# Patient Record
Sex: MALE | Race: WHITE | NOT HISPANIC OR LATINO | Employment: OTHER | URBAN - METROPOLITAN AREA
[De-identification: names, ages, dates, MRNs, and addresses within clinical notes are randomized per-mention and may not be internally consistent; named-entity substitution may affect disease eponyms.]

---

## 2018-01-15 ENCOUNTER — ALLSCRIPTS OFFICE VISIT (OUTPATIENT)
Dept: OTHER | Facility: OTHER | Age: 33
End: 2018-01-15

## 2018-01-15 LAB
BILIRUB UR QL STRIP: NEGATIVE
CLARITY UR: NORMAL
COLOR UR: YELLOW
GLUCOSE (HISTORICAL): NEGATIVE
HGB UR QL STRIP.AUTO: NEGATIVE
KETONES UR STRIP-MCNC: NEGATIVE MG/DL
LEUKOCYTE ESTERASE UR QL STRIP: NEGATIVE
NITRITE UR QL STRIP: NEGATIVE
PH UR STRIP.AUTO: 6 [PH]
PROT UR STRIP-MCNC: NEGATIVE MG/DL
SP GR UR STRIP.AUTO: 1.02
UROBILINOGEN UR QL STRIP.AUTO: 0.2

## 2018-01-16 NOTE — PROGRESS NOTES
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 he does not report require corrective lenses  Chief Complaint   patient presents today for the express purpose is of a CDL examination he has not takes no medications he has no history of seizure disorder cardiac disorder epilepsy or asthma he is not diabetic and he does not have sleep apnea he has good color vision and has no      History of Present Illness   HPI: patient presents for CDL physical      Review of Systems        Constitutional: no fever or chills, feels well, no tiredness, no recent weight loss or weight gain  ENT: no complaints of earache, no loss of hearing, no nosebleeds or nasal discharge, no sore throat or hoarseness  Cardiovascular: no complaints of slow or fast heart rate, no chest pain, no palpitations, no leg claudication or lower extremity edema  Respiratory: no complaints of shortness of breath, no wheezing or cough, no dyspnea on exertion, no orthopnea or PND  Gastrointestinal: no complaints of abdominal pain, no constipation, no nausea or vomiting, no diarrhea or bloody stools  Genitourinary: no complaints of dysuria or incontinence, no hesitancy, no nocturia, no genital lesion, no inadequacy of penile erection  Musculoskeletal: no complaints of arthralgia, no myalgia, no joint swelling or stiffness, no limb pain or swelling  Integumentary: no complaints of skin rash or lesion, no itching or dry skin, no skin wounds        Neurological: no complaints of headache, no confusion, no numbness or tingling, no dizziness or fainting  ROS reviewed  Active Problems   1  Encounter for CDL (commercial driving license) exam (O45 7) (Z02 4)   2  Need for influenza vaccination (V04 81) (Z23)    Past Medical History   Active Problems And Past Medical History Reviewed: The active problems and past medical history were reviewed and updated today  Family History   Mother    1  No pertinent family history  Father    2  No pertinent family history  Family History Reviewed: The family history was reviewed and updated today  Social History    · Never a smoker   · Never Drank Alcohol  The social history was reviewed and updated today  The social history was reviewed and is unchanged  Surgical History   1  History of Knee Surgery  Surgical History Reviewed: The surgical history was reviewed and updated today  Current Meds    1  No Reported Medications Recorded     The medication list was reviewed and updated today  Allergies   1  No Known Drug Allergies    Vitals    Recorded: [id number] 05:22PM Recorded: [id number] 11:05EC   Systolic 390 767, LUE, Sitting   Diastolic 82 292, LUE, Sitting   Height  5 ft 11 in   Weight  196 lb    BMI Calculated  27 34   BSA Calculated  2 09     Physical Exam        Constitutional      General appearance: No acute distress, well appearing and well nourished  Eyes      Conjunctiva and lids: No swelling, erythema, or discharge  Pupils and irises: Equal, round and reactive to light  Ears, Nose, Mouth, and Throat      External inspection of ears and nose: Normal        Otoscopic examination: Tympanic membrance translucent with normal light reflex  Canals patent without erythema  Nasal mucosa, septum, and turbinates: Normal without edema or erythema  Oropharynx: Normal with no erythema, edema, exudate or lesions  Pulmonary      Respiratory effort: No increased work of breathing or signs of respiratory distress         Auscultation of lungs: Clear to auscultation, equal breath sounds bilaterally, no wheezes, no rales, no rhonci  Cardiovascular      Palpation of heart: Normal PMI, no thrills  Auscultation of heart: Normal rate and rhythm, normal S1 and S2, without murmurs  Examination of extremities for edema and/or varicosities: Normal        Carotid pulses: Normal        Abdomen      Abdomen: Non-tender, no masses  Liver and spleen: No hepatomegaly or splenomegaly  Lymphatic      Palpation of lymph nodes in neck: No lymphadenopathy  Musculoskeletal      Gait and station: Normal        Digits and nails: Normal without clubbing or cyanosis  Inspection/palpation of joints, bones, and muscles: Normal        Skin      Skin and subcutaneous tissue: Normal without rashes or lesions  Neurologic      Cranial nerves: Cranial nerves 2-12 intact  Reflexes: 2+ and symmetric  Sensation: No sensory loss         Psychiatric      Orientation to person, place and time: Normal        Mood and affect: Normal           Results/Data   SNELLEN VISION- POC [id number] 09:47AM Shreyas Mazariegos      Test Name Result Flag Reference   Right Eye uncorrected 20/15     Left Eye uncorrected 20/15     Bilateral Eyes uncorrected 20/13        Urine Dip Automated- POC [id number] 09:47AM Shreyas Mazariegos      Test Name Result Flag Reference   Color Yellow     Clarity Transparent     Leukocytes negative     Nitrite negative     Blood negative     Bilirubin negative     Urobilinogen 0 2     Protein negative     Ph 6 0     Specific Gravity 1 025     Ketone negative     Glucose negative        *VB-Depression Screening [id number] 09:45AM Shreyas Mazariegos      Test Name Result Flag Reference   Depression Scale Result      Depression Screen - Negative For Symptoms      PHQ-2 Adult Depression Screening [id number] 09:44AM User, Ahs      Test Name Result Flag Reference   PHQ-2 Adult Depression Score 0     Over the last two weeks, how often have you been bothered by any of the following problems?      Little interest or pleasure in doing things: Not at all - 0     Feeling down, depressed, or hopeless: Not at all - 0   PHQ-2 Adult Depression Screening Negative          Signatures    Electronically signed by : Killian Man DO; Delonte 15 2018  5:23PM EST                       (Author)

## 2018-01-23 VITALS
WEIGHT: 196 LBS | DIASTOLIC BLOOD PRESSURE: 82 MMHG | HEIGHT: 71 IN | BODY MASS INDEX: 27.44 KG/M2 | SYSTOLIC BLOOD PRESSURE: 132 MMHG

## 2018-03-19 ENCOUNTER — OFFICE VISIT (OUTPATIENT)
Dept: URGENT CARE | Facility: CLINIC | Age: 33
End: 2018-03-19
Payer: COMMERCIAL

## 2018-03-19 VITALS
DIASTOLIC BLOOD PRESSURE: 62 MMHG | HEART RATE: 113 BPM | RESPIRATION RATE: 20 BRPM | OXYGEN SATURATION: 96 % | TEMPERATURE: 101.3 F | SYSTOLIC BLOOD PRESSURE: 170 MMHG

## 2018-03-19 DIAGNOSIS — J11.1 INFLUENZA: Primary | ICD-10-CM

## 2018-03-19 PROCEDURE — S9088 SERVICES PROVIDED IN URGENT: HCPCS | Performed by: FAMILY MEDICINE

## 2018-03-19 PROCEDURE — 99203 OFFICE O/P NEW LOW 30 MIN: CPT | Performed by: FAMILY MEDICINE

## 2018-03-19 RX ORDER — PREDNISONE 50 MG/1
50 TABLET ORAL DAILY
Qty: 5 TABLET | Refills: 0 | Status: SHIPPED | OUTPATIENT
Start: 2018-03-19 | End: 2018-03-24

## 2018-03-19 NOTE — PROGRESS NOTES
[employer] [facility] [employer]        NAME: Narendra Person is a 28 y o  male  : 1985    MRN: [id number]  DATE: 2018  TIME: 5:02 PM    Assessment and Plan   Influenza [J11 1]  1  Influenza           Patient Instructions       Follow up with PCP in 3-5 days  Proceed to  ER if symptoms worsen  Chief Complaint     Chief Complaint   Patient presents with   Colletta Claw Like Symptoms     Started Friday with coughing body aches and fevers  Not taking any OTC cold medications  [facility] LPN          History of Present Illness       Started Friday with coughing body aches and fevers  Not taking any OTC cold medications  URI    This is a new problem  The current episode started in the past 7 days  The problem has been unchanged  The maximum temperature recorded prior to his arrival was 102 - 102 9 F  The fever has been present for 3 to 4 days  Associated symptoms include joint pain  Review of Systems   Review of Systems   Constitutional: Positive for activity change, chills, fatigue and fever  HENT: Negative  Eyes: Negative  Respiratory: Negative  Cardiovascular: Negative  Gastrointestinal: Negative  Musculoskeletal: Positive for joint pain  Current Medications     No current outpatient prescriptions on file  Current Allergies     Allergies as of 2018    (No Known Allergies)            The following portions of the patient's history were reviewed and updated as appropriate: allergies, current medications, past family history, past medical history, past social history, past surgical history and problem list      No past medical history on file  No past surgical history on file  No family history on file  Medications have been verified  Objective   /62   Pulse (!) 113   Temp (!) 101 3 °F (38 5 °C) (Tympanic)   Resp 20   SpO2 96%        Physical Exam     Physical Exam   Constitutional: He is oriented to person, place, and time   He appears well-developed  HENT:   Right Ear: External ear normal    Left Ear: External ear normal    Nose: Nose normal    Mouth/Throat: Oropharynx is clear and moist  No oropharyngeal exudate  Eyes: Conjunctivae are normal    Neck: Normal range of motion  Neck supple  Cardiovascular: Normal rate, regular rhythm and normal heart sounds  No murmur heard  Pulmonary/Chest: Effort normal and breath sounds normal  No respiratory distress  He has no wheezes  He has no rales  He exhibits no tenderness  Abdominal: Soft  He exhibits no distension and no mass  There is no tenderness  There is no rebound and no guarding  Musculoskeletal: Normal range of motion  Lymphadenopathy:     He has no cervical adenopathy  Neurological: He is alert and oriented to person, place, and time  No cranial nerve deficit  Skin: Skin is warm  No rash noted  No erythema

## 2018-03-19 NOTE — PATIENT INSTRUCTIONS
Influenza   AMBULATORY CARE:   Influenza  (the flu) is an infection caused by the influenza virus  The flu is easily spread when an infected person coughs, sneezes, or has close contact with others  You may be able to spread the flu to others for 1 week or longer after signs or symptoms appear  Common signs and symptoms include the following:   · Fever and chills    · Headaches, body aches, and muscle or joint pain    · Cough, runny nose, and sore throat    · Loss of appetite, nausea, vomiting, or diarrhea    · Tiredness    · Trouble breathing  Call 911 for any of the following:   · You have trouble breathing, and your lips look purple or blue  · You have a seizure  Seek care immediately if:   · You are dizzy, or you are urinating less or not at all  · You have a headache with a stiff neck, and you feel tired or confused  · You have new pain or pressure in your chest     · Your symptoms, such as shortness of breath, vomiting, or diarrhea, get worse  · Your symptoms, such as fever and coughing, seem to get better, but then get worse  Contact your healthcare provider if:   · You have new muscle pain or weakness  · You have questions or concerns about your condition or care  Treatment for influenza  may include any of the following:  · Acetaminophen  decreases pain and fever  It is available without a doctor's order  Ask how much to take and how often to take it  Follow directions  Acetaminophen can cause liver damage if not taken correctly  · NSAIDs , such as ibuprofen, help decrease swelling, pain, and fever  This medicine is available with or without a doctor's order  NSAIDs can cause stomach bleeding or kidney problems in certain people  If you take blood thinner medicine, always ask your healthcare provider if NSAIDs are safe for you  Always read the medicine label and follow directions  · Antivirals  help fight a viral infection    Manage your symptoms:   · Rest  as much as you can to help you recover  · Drink liquids as directed  to help prevent dehydration  Ask how much liquid to drink each day and which liquids are best for you  Prevent the spread of the flu:   · Wash your hands often  Use soap and water  Wash your hands after you use the bathroom, change a child's diapers, or sneeze  Wash your hands before you prepare or eat food  Use gel hand cleanser when soap and water are not available  Do not touch your eyes, nose, or mouth unless you have washed your hands first            · Cover your mouth when you sneeze or cough  Cough into a tissue or the bend of your arm  · Clean shared items with a germ-killing   Clean table surfaces, doorknobs, and light switches  Do not share towels, silverware, and dishes with people who are sick  Wash bed sheets, towels, silverware, and dishes with soap and water  · Wear a mask  over your mouth and nose if you are sick or are near anyone who is sick  · Stay away from others  if you are sick  · Influenza vaccine  helps prevent influenza (flu)  Everyone older than 6 months should get a yearly influenza vaccine  Get the vaccine as soon as it is available, usually in September or October each year  Follow up with your healthcare provider as directed:  Write down your questions so you remember to ask them during your visits  © 2017 [employer] Information is for End User's use only and may not be sold, redistributed or otherwise used for commercial purposes  All illustrations and images included in CareNotes® are the copyrighted property of [employer]  or [employer]  The above information is an  only  It is not intended as medical advice for individual conditions or treatments  Talk to your doctor, nurse or pharmacist before following any medical regimen to see if it is safe and effective for you

## 2021-01-07 ENCOUNTER — OFFICE VISIT (OUTPATIENT)
Dept: UROLOGY | Facility: MEDICAL CENTER | Age: 36
End: 2021-01-07
Payer: COMMERCIAL

## 2021-01-07 VITALS
DIASTOLIC BLOOD PRESSURE: 82 MMHG | WEIGHT: 200 LBS | SYSTOLIC BLOOD PRESSURE: 134 MMHG | BODY MASS INDEX: 28 KG/M2 | HEIGHT: 71 IN

## 2021-01-07 DIAGNOSIS — Z30.09 STERILIZATION CONSULT: Primary | ICD-10-CM

## 2021-01-07 PROCEDURE — 99243 OFF/OP CNSLTJ NEW/EST LOW 30: CPT | Performed by: UROLOGY

## 2021-01-07 RX ORDER — HYDROCODONE BITARTRATE AND ACETAMINOPHEN 5; 325 MG/1; MG/1
TABLET ORAL
Qty: 10 TABLET | Refills: 0 | Status: SHIPPED | OUTPATIENT
Start: 2021-01-07

## 2021-01-07 RX ORDER — ALPRAZOLAM 1 MG/1
TABLET ORAL
Qty: 1 TABLET | Refills: 0 | Status: SHIPPED | OUTPATIENT
Start: 2021-01-07

## 2021-01-07 NOTE — PROGRESS NOTES
HISTORY:    Vasectomy consultation  No  history         ASSESSMENT / PLAN:        This patient has for children and would like to have a vasectomy  He and his wife or partner are sure they want no more children, and are aware that vasectomy is intended to be a permanent form of sterilization  He has been told and understands the following: We will order a semen analysis to check for sterility after three months, and that he must use protection during that time  No guarantee can be made of permanent sterility, and that failure of the procedure is not common, but can occur  Furthermore, spontaneous reestablishment of the flow sperm is quite rare but is a possible reason for failure of the procedure  Although it is not mandatory, if he wants to request another semen sample at any time in the future, to ensure continued sterility, he can do so, at his decision  Potential complications of the procedure include, but are not limited to:  Excessive bleeding which can cause significant swelling; infections; chronic lingering pain of the testicle; damage to the blood supply of the testicle, which might lead to testicular atrophy  The patient has told me he understands the above statements, and wishes to proceed with scheduling the vasectomy  The following portions of the patient's history were reviewed and updated as appropriate: allergies, current medications, past family history, past medical history, past social history, past surgical history and problem list     Review of Systems   All other systems reviewed and are negative  Objective:     Physical Exam  Genitourinary:     Comments: Penis testes normal          No results found for: PSA]  No results found for: BUN  No results found for: CREATININE  No components found for: CBC      There is no problem list on file for this patient         Diagnoses and all orders for this visit:    Sterilization consult Patient ID: Milton Hare is a 28 y o  male  No current outpatient medications on file  No past medical history on file  No past surgical history on file      Social History

## 2021-03-12 ENCOUNTER — PROCEDURE VISIT (OUTPATIENT)
Dept: UROLOGY | Facility: MEDICAL CENTER | Age: 36
End: 2021-03-12
Payer: COMMERCIAL

## 2021-03-12 VITALS — HEIGHT: 71 IN | DIASTOLIC BLOOD PRESSURE: 82 MMHG | BODY MASS INDEX: 27.89 KG/M2 | SYSTOLIC BLOOD PRESSURE: 136 MMHG

## 2021-03-12 DIAGNOSIS — Z30.2 ENCOUNTER FOR VASECTOMY: Primary | ICD-10-CM

## 2021-03-12 PROCEDURE — 55250 REMOVAL OF SPERM DUCT(S): CPT | Performed by: UROLOGY

## 2021-03-12 PROCEDURE — 88302 TISSUE EXAM BY PATHOLOGIST: CPT | Performed by: PATHOLOGY

## 2021-03-16 ENCOUNTER — TELEPHONE (OUTPATIENT)
Dept: UROLOGY | Facility: MEDICAL CENTER | Age: 36
End: 2021-03-16

## 2021-03-16 NOTE — TELEPHONE ENCOUNTER
Spoke with pt regarding his procedure on 3/12/21  Pt said, he is doing well  Pt has no questions    Pt has a follow up for vas check,

## 2024-10-18 ENCOUNTER — APPOINTMENT (OUTPATIENT)
Dept: URGENT CARE | Facility: CLINIC | Age: 39
End: 2024-10-18

## 2025-02-21 ENCOUNTER — APPOINTMENT (EMERGENCY)
Dept: CT IMAGING | Facility: HOSPITAL | Age: 40
End: 2025-02-21
Payer: COMMERCIAL

## 2025-02-21 ENCOUNTER — HOSPITAL ENCOUNTER (EMERGENCY)
Facility: HOSPITAL | Age: 40
Discharge: HOME | End: 2025-02-21
Attending: EMERGENCY MEDICINE | Admitting: EMERGENCY MEDICINE
Payer: COMMERCIAL

## 2025-02-21 ENCOUNTER — APPOINTMENT (EMERGENCY)
Dept: ULTRASOUND IMAGING | Facility: HOSPITAL | Age: 40
End: 2025-02-21
Payer: COMMERCIAL

## 2025-02-21 VITALS
HEART RATE: 66 BPM | WEIGHT: 196.87 LBS | BODY MASS INDEX: 27.46 KG/M2 | SYSTOLIC BLOOD PRESSURE: 154 MMHG | OXYGEN SATURATION: 98 % | DIASTOLIC BLOOD PRESSURE: 92 MMHG | TEMPERATURE: 98.1 F | RESPIRATION RATE: 16 BRPM

## 2025-02-21 DIAGNOSIS — R91.1 LUNG NODULE: Primary | ICD-10-CM

## 2025-02-21 DIAGNOSIS — R10.13 EPIGASTRIC ABDOMINAL PAIN: ICD-10-CM

## 2025-02-21 LAB
ALBUMIN SERPL BCG-MCNC: 4.2 G/DL (ref 3.5–5)
ALP SERPL-CCNC: 39 U/L (ref 34–104)
ALT SERPL W P-5'-P-CCNC: 42 U/L (ref 7–52)
ANION GAP SERPL CALCULATED.3IONS-SCNC: 4 MMOL/L (ref 4–13)
APTT PPP: 26 SECONDS (ref 23–34)
AST SERPL W P-5'-P-CCNC: 19 U/L (ref 13–39)
ATRIAL RATE: 62 BPM
BASOPHILS # BLD AUTO: 0.02 THOUSANDS/ΜL (ref 0–0.1)
BASOPHILS NFR BLD AUTO: 0 % (ref 0–1)
BILIRUB SERPL-MCNC: 1.01 MG/DL (ref 0.2–1)
BUN SERPL-MCNC: 17 MG/DL (ref 5–25)
CALCIUM SERPL-MCNC: 8.7 MG/DL (ref 8.4–10.2)
CARDIAC TROPONIN I PNL SERPL HS: 3 NG/L (ref ?–50)
CHLORIDE SERPL-SCNC: 107 MMOL/L (ref 96–108)
CO2 SERPL-SCNC: 29 MMOL/L (ref 21–32)
CREAT SERPL-MCNC: 1.04 MG/DL (ref 0.6–1.3)
EOSINOPHIL # BLD AUTO: 0.17 THOUSAND/ΜL (ref 0–0.61)
EOSINOPHIL NFR BLD AUTO: 3 % (ref 0–6)
ERYTHROCYTE [DISTWIDTH] IN BLOOD BY AUTOMATED COUNT: 12.4 % (ref 11.6–15.1)
GFR SERPL CREATININE-BSD FRML MDRD: 90 ML/MIN/1.73SQ M
GLUCOSE SERPL-MCNC: 74 MG/DL (ref 65–140)
HCT VFR BLD AUTO: 41.8 % (ref 36.5–49.3)
HGB BLD-MCNC: 15 G/DL (ref 12–17)
IMM GRANULOCYTES # BLD AUTO: 0.01 THOUSAND/UL (ref 0–0.2)
IMM GRANULOCYTES NFR BLD AUTO: 0 % (ref 0–2)
INR PPP: 1.02 (ref 0.85–1.19)
LACTATE SERPL-SCNC: 1 MMOL/L (ref 0.5–2)
LIPASE SERPL-CCNC: 13 U/L (ref 11–82)
LYMPHOCYTES # BLD AUTO: 1.45 THOUSANDS/ΜL (ref 0.6–4.47)
LYMPHOCYTES NFR BLD AUTO: 25 % (ref 14–44)
MAGNESIUM SERPL-MCNC: 2.1 MG/DL (ref 1.9–2.7)
MCH RBC QN AUTO: 29.9 PG (ref 26.8–34.3)
MCHC RBC AUTO-ENTMCNC: 35.9 G/DL (ref 31.4–37.4)
MCV RBC AUTO: 83 FL (ref 82–98)
MONOCYTES # BLD AUTO: 0.75 THOUSAND/ΜL (ref 0.17–1.22)
MONOCYTES NFR BLD AUTO: 13 % (ref 4–12)
NEUTROPHILS # BLD AUTO: 3.37 THOUSANDS/ΜL (ref 1.85–7.62)
NEUTS SEG NFR BLD AUTO: 59 % (ref 43–75)
NRBC BLD AUTO-RTO: 0 /100 WBCS
P AXIS: 56 DEGREES
PLATELET # BLD AUTO: 189 THOUSANDS/UL (ref 149–390)
PMV BLD AUTO: 10.2 FL (ref 8.9–12.7)
POTASSIUM SERPL-SCNC: 3.6 MMOL/L (ref 3.5–5.3)
PR INTERVAL: 152 MS
PROT SERPL-MCNC: 6.6 G/DL (ref 6.4–8.4)
PROTHROMBIN TIME: 13.8 SECONDS (ref 12.3–15)
QRS AXIS: 63 DEGREES
QRSD INTERVAL: 92 MS
QT INTERVAL: 376 MS
QTC INTERVAL: 381 MS
RBC # BLD AUTO: 5.02 MILLION/UL (ref 3.88–5.62)
SODIUM SERPL-SCNC: 140 MMOL/L (ref 135–147)
T WAVE AXIS: 9 DEGREES
VENTRICULAR RATE: 62 BPM
WBC # BLD AUTO: 5.77 THOUSAND/UL (ref 4.31–10.16)

## 2025-02-21 PROCEDURE — 85610 PROTHROMBIN TIME: CPT | Performed by: EMERGENCY MEDICINE

## 2025-02-21 PROCEDURE — 83690 ASSAY OF LIPASE: CPT | Performed by: EMERGENCY MEDICINE

## 2025-02-21 PROCEDURE — 74177 CT ABD & PELVIS W/CONTRAST: CPT

## 2025-02-21 PROCEDURE — 83735 ASSAY OF MAGNESIUM: CPT | Performed by: EMERGENCY MEDICINE

## 2025-02-21 PROCEDURE — 85730 THROMBOPLASTIN TIME PARTIAL: CPT | Performed by: EMERGENCY MEDICINE

## 2025-02-21 PROCEDURE — 36415 COLL VENOUS BLD VENIPUNCTURE: CPT | Performed by: EMERGENCY MEDICINE

## 2025-02-21 PROCEDURE — 99284 EMERGENCY DEPT VISIT MOD MDM: CPT

## 2025-02-21 PROCEDURE — 80053 COMPREHEN METABOLIC PANEL: CPT | Performed by: EMERGENCY MEDICINE

## 2025-02-21 PROCEDURE — 99285 EMERGENCY DEPT VISIT HI MDM: CPT | Performed by: EMERGENCY MEDICINE

## 2025-02-21 PROCEDURE — 71275 CT ANGIOGRAPHY CHEST: CPT

## 2025-02-21 PROCEDURE — 96375 TX/PRO/DX INJ NEW DRUG ADDON: CPT

## 2025-02-21 PROCEDURE — 93005 ELECTROCARDIOGRAM TRACING: CPT

## 2025-02-21 PROCEDURE — 96374 THER/PROPH/DIAG INJ IV PUSH: CPT

## 2025-02-21 PROCEDURE — 84484 ASSAY OF TROPONIN QUANT: CPT | Performed by: EMERGENCY MEDICINE

## 2025-02-21 PROCEDURE — 83605 ASSAY OF LACTIC ACID: CPT | Performed by: EMERGENCY MEDICINE

## 2025-02-21 PROCEDURE — 96361 HYDRATE IV INFUSION ADD-ON: CPT

## 2025-02-21 PROCEDURE — 85025 COMPLETE CBC W/AUTO DIFF WBC: CPT | Performed by: EMERGENCY MEDICINE

## 2025-02-21 PROCEDURE — 76705 ECHO EXAM OF ABDOMEN: CPT

## 2025-02-21 RX ORDER — LIDOCAINE HYDROCHLORIDE 20 MG/ML
15 SOLUTION OROPHARYNGEAL ONCE
Status: COMPLETED | OUTPATIENT
Start: 2025-02-21 | End: 2025-02-21

## 2025-02-21 RX ORDER — NEBIVOLOL 10 MG/1
1 TABLET ORAL DAILY
COMMUNITY
Start: 2024-12-23

## 2025-02-21 RX ORDER — KETOROLAC TROMETHAMINE 30 MG/ML
15 INJECTION, SOLUTION INTRAMUSCULAR; INTRAVENOUS ONCE
Status: COMPLETED | OUTPATIENT
Start: 2025-02-21 | End: 2025-02-21

## 2025-02-21 RX ORDER — PANTOPRAZOLE SODIUM 40 MG/10ML
40 INJECTION, POWDER, LYOPHILIZED, FOR SOLUTION INTRAVENOUS ONCE
Status: COMPLETED | OUTPATIENT
Start: 2025-02-21 | End: 2025-02-21

## 2025-02-21 RX ORDER — MAGNESIUM HYDROXIDE/ALUMINUM HYDROXICE/SIMETHICONE 120; 1200; 1200 MG/30ML; MG/30ML; MG/30ML
30 SUSPENSION ORAL ONCE
Status: COMPLETED | OUTPATIENT
Start: 2025-02-21 | End: 2025-02-21

## 2025-02-21 RX ORDER — LOSARTAN POTASSIUM 100 MG/1
100 TABLET ORAL DAILY
COMMUNITY

## 2025-02-21 RX ADMIN — LIDOCAINE HYDROCHLORIDE 15 ML: 20 SOLUTION ORAL at 10:40

## 2025-02-21 RX ADMIN — KETOROLAC TROMETHAMINE 15 MG: 30 INJECTION, SOLUTION INTRAMUSCULAR; INTRAVENOUS at 10:41

## 2025-02-21 RX ADMIN — SODIUM CHLORIDE 1000 ML: 0.9 INJECTION, SOLUTION INTRAVENOUS at 10:44

## 2025-02-21 RX ADMIN — ALUMINUM HYDROXIDE, MAGNESIUM HYDROXIDE, AND SIMETHICONE 30 ML: 1200; 120; 1200 SUSPENSION ORAL at 10:40

## 2025-02-21 RX ADMIN — PANTOPRAZOLE SODIUM 40 MG: 40 INJECTION, POWDER, FOR SOLUTION INTRAVENOUS at 10:42

## 2025-02-21 RX ADMIN — IOHEXOL 100 ML: 350 INJECTION, SOLUTION INTRAVENOUS at 10:59

## 2025-02-21 NOTE — ED PROVIDER NOTES
Time reflects when diagnosis was documented in both MDM as applicable and the Disposition within this note       Time User Action Codes Description Comment    2/21/2025 12:08 PM Leon Linton Add [R91.1] Lung nodule     2/21/2025  1:33 PM Leon Linton Add [R10.13] Epigastric abdominal pain           ED Disposition       ED Disposition   Discharge    Condition   Stable    Date/Time   Fri Feb 21, 2025  1:33 PM    Comment   Aryan Bingham discharge to home/self care.                   Assessment & Plan       Medical Decision Making  Amount and/or Complexity of Data Reviewed  Labs: ordered. Decision-making details documented in ED Course.  Radiology: ordered. Decision-making details documented in ED Course.  ECG/medicine tests: ordered and independent interpretation performed. Decision-making details documented in ED Course.  Discussion of management or test interpretation with external provider(s): At risk for but not limited to STEMI, NSTEMI, cholelithiasis, cholecystitis, peptic ulcer disease, gastritis, pancreatitis, diverticulitis, colitis, bowel obstruction, appendicitis, UTI, ureteral stone, kidney stone, inflammatory bowel disease.    Risk  OTC drugs.  Prescription drug management.        ED Course as of 02/21/25 1345   Fri Feb 21, 2025   1309 Discussed with general surgery.  Can follow-up as an outpatient.  Can see him on Monday.   1342 Discussed with general surgery on-call, Dr. Osullivan.  Could be acalculous cholecystitis.  Will see in the office on Monday at 9:15 AM.  Discussed with patient and wife.  Agree with plan of care.       Medications   sodium chloride 0.9 % bolus 1,000 mL (1,000 mL Intravenous New Bag 2/21/25 1044)   ketorolac (TORADOL) injection 15 mg (15 mg Intravenous Given 2/21/25 1041)   pantoprazole (PROTONIX) injection 40 mg (40 mg Intravenous Given 2/21/25 1042)   aluminum-magnesium hydroxide-simethicone (MAALOX) oral suspension 30 mL (30 mL Oral Given 2/21/25 1040)   Lidocaine  Viscous HCl (XYLOCAINE) 2 % mucosal solution 15 mL (15 mL Swish & Swallow Given 2/21/25 1040)   iohexol (OMNIPAQUE) 350 MG/ML injection (MULTI-DOSE) 100 mL (100 mL Intravenous Given 2/21/25 1059)       ED Risk Strat Scores                                                History of Present Illness       Chief Complaint   Patient presents with    Epigastric Pain     Patient reports x2 days. When he takes a deep breath pain radiates into the back.       Past Medical History:   Diagnosis Date    Hypertension       History reviewed. No pertinent surgical history.   History reviewed. No pertinent family history.   Social History     Tobacco Use    Smoking status: Never    Smokeless tobacco: Never   Substance Use Topics    Alcohol use: Not Currently    Drug use: Not Currently      E-Cigarette/Vaping      E-Cigarette/Vaping Substances      I have reviewed and agree with the history as documented.     Patient complains of diffuse upper abdominal pain has been ongoing for the past 2 days.  Constant.  Radiates to the back.  No nausea or vomiting or diarrhea.  Last bowel movement was today and normal.  No change with eating.  No history of peptic ulcer disease or gallbladder disease.  Denies any alcohol use.  No tobacco use.  Nothing taken prior to arrival for his pain.  No cough or cold symptoms.  No rash.      Epigastric Pain  Pain location:  Epigastric  Pain quality: aching    Pain radiates to the back: yes    Pain severity:  Mild  Onset quality:  Gradual  Duration:  2 days  Timing:  Constant  Progression:  Unchanged  Chronicity:  New  Context: at rest    Relieved by:  Nothing  Worsened by:  Nothing tried  Ineffective treatments:  None tried  Associated symptoms: abdominal pain    Associated symptoms: no anorexia, no claudication, no cough, no dizziness, no dysphagia, no fever, no headache, no nausea, no near-syncope, no palpitations, no shortness of breath, not vomiting and no weakness        Review of Systems    Constitutional:  Negative for appetite change, chills, fever and unexpected weight change.   HENT:  Negative for ear pain, hearing loss, sore throat, trouble swallowing and voice change.    Eyes:  Negative for pain and discharge.   Respiratory:  Negative for cough, shortness of breath and wheezing.    Cardiovascular:  Negative for chest pain, palpitations, claudication and near-syncope.   Gastrointestinal:  Positive for abdominal pain. Negative for anorexia, blood in stool, constipation, diarrhea, nausea and vomiting.   Genitourinary:  Negative for dysuria, flank pain, frequency and hematuria.   Musculoskeletal:  Negative for joint swelling, neck pain and neck stiffness.   Skin:  Negative for rash and wound.   Neurological:  Negative for dizziness, seizures, syncope, facial asymmetry, weakness and headaches.   Psychiatric/Behavioral:  Negative for hallucinations, self-injury and suicidal ideas.    All other systems reviewed and are negative.          Objective       ED Triage Vitals [02/21/25 1022]   Temperature Pulse Blood Pressure Respirations SpO2 Patient Position - Orthostatic VS   98.1 °F (36.7 °C) 62 145/93 17 97 % Lying      Temp src Heart Rate Source BP Location FiO2 (%) Pain Score    -- Monitor Right arm -- 7      Vitals      Date and Time Temp Pulse SpO2 Resp BP Pain Score FACES Pain Rating User   02/21/25 1130 -- 61 97 % 16 140/71 -- --    02/21/25 1045 -- 63 96 % 15 143/93 -- --    02/21/25 1041 -- -- -- -- -- 5 --    02/21/25 1030 -- 62 97 % 16 144/92 -- --    02/21/25 1022 98.1 °F (36.7 °C) 62 97 % 17 145/93 7 -- AS            Physical Exam  Vitals and nursing note reviewed.   Constitutional:       General: He is not in acute distress.     Appearance: He is well-developed.   HENT:      Head: Normocephalic and atraumatic.      Right Ear: External ear normal.      Left Ear: External ear normal.   Eyes:      General: No scleral icterus.        Right eye: No discharge.         Left eye: No  discharge.      Extraocular Movements: Extraocular movements intact.      Conjunctiva/sclera: Conjunctivae normal.   Cardiovascular:      Rate and Rhythm: Normal rate and regular rhythm.      Heart sounds: Normal heart sounds. No murmur heard.  Pulmonary:      Effort: Pulmonary effort is normal.      Breath sounds: Normal breath sounds. No wheezing or rales.   Abdominal:      General: Bowel sounds are normal. There is no distension.      Palpations: Abdomen is soft.      Tenderness: There is abdominal tenderness. There is no guarding or rebound.      Comments: Tender epigastric and left upper quadrant region.   Musculoskeletal:         General: No deformity. Normal range of motion.      Cervical back: Normal range of motion and neck supple.   Skin:     General: Skin is warm and dry.      Findings: No rash.   Neurological:      General: No focal deficit present.      Mental Status: He is alert and oriented to person, place, and time.      Cranial Nerves: No cranial nerve deficit.   Psychiatric:         Mood and Affect: Mood normal.         Behavior: Behavior normal.         Thought Content: Thought content normal.         Judgment: Judgment normal.         Results Reviewed       Procedure Component Value Units Date/Time    HS Troponin 0hr (reflex protocol) [[id number]]  (Normal) Collected: 02/21/25 1039    Lab Status: Final result Specimen: Blood from Arm, Left Updated: 02/21/25 1115     hs TnI 0hr 3 ng/L     Protime-INR [[id number]]  (Normal) Collected: 02/21/25 1039    Lab Status: Final result Specimen: Blood from Arm, Left Updated: 02/21/25 1114     Protime 13.8 seconds      INR 1.02    Narrative:      INR Therapeutic Range    Indication                                             INR Range      Atrial Fibrillation                                               2.0-3.0  Hypercoagulable State                                    2.0.2.3  Left Ventricular Asist Device                            2.0-3.0  Mechanical Heart  Valve                                  -    Aortic(with afib, MI, embolism, HF, LA enlargement,    and/or coagulopathy)                                     2.0-3.0 (2.5-3.5)     Mitral                                                             2.5-3.5  Prosthetic/Bioprosthetic Heart Valve               2.0-3.0  Venous thromboembolism (VTE: VT, PE        2.0-3.0    APTT [[id number]]  (Normal) Collected: 02/21/25 1039    Lab Status: Final result Specimen: Blood from Arm, Left Updated: 02/21/25 1114     PTT 26 seconds     Lactic acid, plasma (w/reflex if result > 2.0) [[id number]]  (Normal) Collected: 02/21/25 1039    Lab Status: Final result Specimen: Blood from Arm, Left Updated: 02/21/25 1106     LACTIC ACID 1.0 mmol/L     Narrative:      Result may be elevated if tourniquet was used during collection.    Comprehensive metabolic panel [[id number]]  (Abnormal) Collected: 02/21/25 1039    Lab Status: Final result Specimen: Blood from Arm, Left Updated: 02/21/25 1106     Sodium 140 mmol/L      Potassium 3.6 mmol/L      Chloride 107 mmol/L      CO2 29 mmol/L      ANION GAP 4 mmol/L      BUN 17 mg/dL      Creatinine 1.04 mg/dL      Glucose 74 mg/dL      Calcium 8.7 mg/dL      AST 19 U/L      ALT 42 U/L      Alkaline Phosphatase 39 U/L      Total Protein 6.6 g/dL      Albumin 4.2 g/dL      Total Bilirubin 1.01 mg/dL      eGFR 90 ml/min/1.73sq m     Narrative:      National Kidney Disease Foundation guidelines for Chronic Kidney Disease (CKD):     Stage 1 with normal or high GFR (GFR > 90 mL/min/1.73 square meters)    Stage 2 Mild CKD (GFR = 60-89 mL/min/1.73 square meters)    Stage 3A Moderate CKD (GFR = 45-59 mL/min/1.73 square meters)    Stage 3B Moderate CKD (GFR = 30-44 mL/min/1.73 square meters)    Stage 4 Severe CKD (GFR = 15-29 mL/min/1.73 square meters)    Stage 5 End Stage CKD (GFR <15 mL/min/1.73 square meters)  Note: GFR calculation is accurate only with a steady state creatinine    Magnesium [[id number]]   (Normal) Collected: 02/21/25 1039    Lab Status: Final result Specimen: Blood from Arm, Left Updated: 02/21/25 1106     Magnesium 2.1 mg/dL     Lipase [[id number]]  (Normal) Collected: 02/21/25 1039    Lab Status: Final result Specimen: Blood from Arm, Left Updated: 02/21/25 1106     Lipase 13 u/L     CBC and differential [[id number]]  (Abnormal) Collected: 02/21/25 1039    Lab Status: Final result Specimen: Blood from Arm, Left Updated: 02/21/25 1049     WBC 5.77 Thousand/uL      RBC 5.02 Million/uL      Hemoglobin 15.0 g/dL      Hematocrit 41.8 %      MCV 83 fL      MCH 29.9 pg      MCHC 35.9 g/dL      RDW 12.4 %      MPV 10.2 fL      Platelets 189 Thousands/uL      nRBC 0 /100 WBCs      Segmented % 59 %      Immature Grans % 0 %      Lymphocytes % 25 %      Monocytes % 13 %      Eosinophils Relative 3 %      Basophils Relative 0 %      Absolute Neutrophils 3.37 Thousands/µL      Absolute Immature Grans 0.01 Thousand/uL      Absolute Lymphocytes 1.45 Thousands/µL      Absolute Monocytes 0.75 Thousand/µL      Eosinophils Absolute 0.17 Thousand/µL      Basophils Absolute 0.02 Thousands/µL             US right upper quadrant   Final Interpretation by Renate Jackson MD (02/21 6662)   Mild hepatomegaly. Increased hepatic echogenicity suggestive of steatosis.   Sonographically unremarkable gallbladder. Negative sonographic Mejia sign. No biliary dilatation.      Workstation performed: [id number]         CT pe study w abdomen pelvis w contrast   Final Interpretation by Tramaine Laura MD (02/21 8474)      CT chest:      No pulmonary embolus.      No evidence of acute thoracic process.      2 noncalcified nodules, 5 mm each with unknown long-term stability and doubtful clinical significance. Based on current Fleischner Society 2017 Guidelines on incidental pulmonary nodule, no routine follow-up is needed if the patient is low risk. If the    patient is high risk, optional follow-up chest CT at 12 months can  be considered.      CT abdomen and pelvis:      Trace pericholecystic fluid or gallbladder wall edema. Correlate with clinical findings. Follow-up gallbladder ultrasound if clinically warranted.      No other evidence of acute abdominopelvic process.      Mild splenomegaly.      1.7 cm splenic artery aneurysm.      Additional chronic findings and negatives as above.      The study was marked in EPIC for immediate notification.                     Workstation performed: [id number]             ECG 12 Lead Documentation Only    Date/Time: 2025 10:31 AM    Performed by: Leon Linton MD  Authorized by: Leon Linton MD    ECG reviewed by me, the ED Provider: yes    Patient location:  ED  Rate:     ECG rate:  60  Rhythm:     Rhythm: sinus rhythm    Ectopy:     Ectopy: none    QRS:     QRS axis:  Normal      ED Medication and Procedure Management   Prior to Admission Medications   Prescriptions Last Dose Informant Patient Reported? Taking?   ALPRAZolam (XANAX) 1 mg tablet Not Taking  No No   Si-2 hr before procedure   Patient not taking: Reported on 2025   HYDROcodone-acetaminophen (NORCO) 5-325 mg per tablet Not Taking  No No   Si-2 tab every 6 hr if needed for pain   Patient not taking: Reported on 2025   losartan (COZAAR) 100 MG tablet 2025  Yes Yes   Sig: Take 100 mg by mouth daily   nebivolol (BYSTOLIC) 10 mg tablet 2025 Morning  Yes Yes   Sig: Take 1 tablet by mouth in the morning      Facility-Administered Medications: None     Patient's Medications   Discharge Prescriptions    No medications on file     No discharge procedures on file.  ED SEPSIS DOCUMENTATION   Time reflects when diagnosis was documented in both MDM as applicable and the Disposition within this note       Time User Action Codes Description Comment    2025 12:08 PM Leon Linton Add [R91.1] Lung nodule     2025  1:33 PM Leon Linton Add [R10.13] Epigastric abdominal pain                   Leon Linton MD  02/21/25 8758

## 2025-02-21 NOTE — Clinical Note
Aryan Bingham was seen and treated in our emergency department on 2/21/2025.                Diagnosis:     Aryan  may return to work on return date.    He may return on this date: 02/25/2025         If you have any questions or concerns, please don't hesitate to call.      Leon Linton MD    ______________________________           _______________          _______________  Hospital Representative                              Date                                Time

## 2025-02-21 NOTE — DISCHARGE INSTRUCTIONS
I discussed your case with the lab results and CAT scan and ultrasound findings with the general surgeon.  They would like to see you on Monday at their office at 915.  Dr. Osullivan will see you in Office building B across the street from here.      Patient Education     Abdominal Pain, Adult ED   General Information   You came to the Emergency Department (ED) for abdominal or belly pain. The doctor feels that the risk of a serious cause for your belly pain is low.  Many things can cause belly pain. Some are serious things like bleeding or an infection. Less serious things, like an upset stomach, can also cause belly pain.  The doctors may not be able to find all serious causes of belly pain the first time they see you. It is important that you follow up with your doctor. You may be waiting on some test results. The staff will notify you if there are concerning results.  What care is needed at home?   Call your regular doctor to let them know you were in the ED. Make a follow-up appointment if you were told to.  Keep a diary about your pain to help your doctor learn more about the cause. Write down the foods you eat to see if they may be the cause of your pain. Also write down what you were doing before and during the pain.  Eat small meals more often. Eat more fiber if hard stools are a problem.  Avoid foods or drinks that make your pain worse. Some people are bothered by:  Drinks that are fizzy or have caffeine.  Fried, greasy, or fatty foods.  Orange juice.  Milk or cheese can bother some people’s stomach as well.  When you have pain, you can:  Try to have a bowel movement.  Lie down and rest.  Avoid solid foods for a few hours. If you are hungry, try liquids like broth or water. When you feel better, try mild foods like rice, crackers, bananas, applesauce, or toast.  Don’t take over-the-counter medicines, such as antacids or laxatives, unless they are ordered.  Check with the doctor before you take any herbal  medicines or supplements.  When do I need to get emergency help?   Call for an ambulance right away if:   You have sudden severe belly pain, or the pain is constant.  You have trouble breathing or chest pain along with your belly pain.  You start throwing up blood or pass a lot of blood in your stool.  Your belly becomes very hard or swollen.  You get a fever 102.2°F (39°C) or higher or shaking chills.  Return to the ED if:   You have signs of severe fluid loss, such as:  No urine for more than 8 hours.  You feel very light-headed or like you are going to pass out.  You feel weak, like you are going to fall.  Your pain gets worse, comes more often or moves to one area of the belly  You have an upset stomach or throwing up that isn’t getting better and are having trouble keeping down food and drink.  Your stools are black or tar colored.  When do I need to call the doctor?   If the pain is not gone or getting better in 1 to 2 days.  You have a fever of 100.4°F (38°C) or higher, chills.  You develop early signs of fluid loss, such as:  Your urine is very dark colored.  Your mouth is dry.  You have muscle cramps.  You have a lack of energy.  You feel light-headed when you get up.  You have pain with passing urine or have blood in your urine.  Your stools have a small amount (less than 1 teaspoon or 5 mL) of blood in them.  You feel that something is not right in your belly.  You have new or worsening symptoms.  Last Reviewed Date   2021-05-07  Consumer Information Use and Disclaimer   This generalized information is a limited summary of diagnosis, treatment, and/or medication information. It is not meant to be comprehensive and should be used as a tool to help the user understand and/or assess potential diagnostic and treatment options. It does NOT include all information about conditions, treatments, medications, side effects, or risks that may apply to a specific patient. It is not intended to be medical advice or a  substitute for the medical advice, diagnosis, or treatment of a health care provider based on the health care provider's examination and assessment of a patient’s specific and unique circumstances. Patients must speak with a health care provider for complete information about their health, medical questions, and treatment options, including any risks or benefits regarding use of medications. This information does not endorse any treatments or medications as safe, effective, or approved for treating a specific patient. [employer] and its affiliates disclaim any warranty or liability relating to this information or the use thereof. The use of this information is governed by the Terms of Use, available at https://www.[employer].com/en/know/clinical-effectiveness-terms   Copyright   Copyright © 2024 [employer], [employer] and its affiliates and/or licensors. All rights reserved.

## 2025-02-24 DIAGNOSIS — R10.11 RIGHT UPPER QUADRANT PAIN: ICD-10-CM

## 2025-03-14 ENCOUNTER — HOSPITAL ENCOUNTER (OUTPATIENT)
Dept: NUCLEAR MEDICINE | Facility: HOSPITAL | Age: 40
Discharge: HOME | End: 2025-03-14
Attending: SURGERY
Payer: COMMERCIAL

## 2025-03-14 DIAGNOSIS — R10.11 RIGHT UPPER QUADRANT PAIN: ICD-10-CM

## 2025-03-14 PROCEDURE — 78227 HEPATOBIL SYST IMAGE W/DRUG: CPT

## 2025-03-14 PROCEDURE — A9537 TC99M MEBROFENIN: HCPCS

## 2025-03-14 RX ORDER — SINCALIDE 5 UG/5ML
0.02 INJECTION, POWDER, LYOPHILIZED, FOR SOLUTION INTRAVENOUS ONCE
Status: COMPLETED | OUTPATIENT
Start: 2025-03-14 | End: 2025-03-14

## 2025-03-14 RX ADMIN — SINCALIDE 1.8 MCG: 5 INJECTION, POWDER, LYOPHILIZED, FOR SOLUTION INTRAVENOUS at 12:37

## 2025-04-15 ENCOUNTER — CONSULT (OUTPATIENT)
Dept: NEPHROLOGY | Facility: CLINIC | Age: 40
End: 2025-04-15
Payer: COMMERCIAL

## 2025-04-15 VITALS
WEIGHT: 201 LBS | HEART RATE: 57 BPM | OXYGEN SATURATION: 99 % | BODY MASS INDEX: 28.14 KG/M2 | SYSTOLIC BLOOD PRESSURE: 158 MMHG | HEIGHT: 71 IN | DIASTOLIC BLOOD PRESSURE: 98 MMHG

## 2025-04-15 DIAGNOSIS — I10 ESSENTIAL HYPERTENSION: Primary | ICD-10-CM

## 2025-04-15 LAB
SL AMB  POCT GLUCOSE, UA: ABNORMAL
SL AMB LEUKOCYTE ESTERASE,UA: ABNORMAL
SL AMB POCT BILIRUBIN,UA: ABNORMAL
SL AMB POCT BLOOD,UA: ABNORMAL
SL AMB POCT CLARITY,UA: CLEAR
SL AMB POCT COLOR,UA: YELLOW
SL AMB POCT KETONES,UA: ABNORMAL
SL AMB POCT NITRITE,UA: ABNORMAL
SL AMB POCT PH,UA: 6.5
SL AMB POCT SPECIFIC GRAVITY,UA: 1.01
SL AMB POCT URINE PROTEIN: ABNORMAL
SL AMB POCT UROBILINOGEN: ABNORMAL

## 2025-04-15 PROCEDURE — 99204 OFFICE O/P NEW MOD 45 MIN: CPT | Performed by: STUDENT IN AN ORGANIZED HEALTH CARE EDUCATION/TRAINING PROGRAM

## 2025-04-15 PROCEDURE — 81002 URINALYSIS NONAUTO W/O SCOPE: CPT | Performed by: STUDENT IN AN ORGANIZED HEALTH CARE EDUCATION/TRAINING PROGRAM

## 2025-04-15 RX ORDER — NIFEDIPINE 30 MG/1
60 TABLET, EXTENDED RELEASE ORAL DAILY
Qty: 180 TABLET | Refills: 1 | Status: SHIPPED | OUTPATIENT
Start: 2025-04-15

## 2025-04-15 NOTE — PROGRESS NOTES
Name: Aryan Bingham      : 1985      MRN: [id number]  Encounter Provider: Joselyn Reyes Bahamonde, MD  Encounter Date: 4/15/2025   Encounter department: [facility]  :  Assessment & Plan  Essential hypertension  Volume: Euvolemic  Blood pressure: Hypertensive 158/98  Recommend:  Low-sodium diet  Change losartan to 100 mg at night  Nebivolol 10 mg daily  Start nifedipine 60 mg in the morning  Will do VAS, renin aldosterone cortisol  24-hour BP monitor  Advised to maintain a good BP control to prevent progression of CKD         Orders:    VAS renal artery complete; Future    Aldosterone/Renin Ratio; Future    POCT urine dip    Cortisol Level, AM Specimen; Future    Metanephrine, Fractionated Plasma Free; Future    Urinalysis with microscopic; Future    Albumin / creatinine urine ratio; Future    NIFEdipine (PROCARDIA XL) 30 mg 24 hr tablet; Take 2 tablets (60 mg total) by mouth daily    Basic metabolic panel; Future    24 hour blood pressure monitoring; Future        History of Present Illness   HPI  Aryan Bingham is a 39 y.o. male hypertension diagnosed at age 37 in the settings of physical who presents initial evaluation of hypertension  History obtained from: patient    Review of Systems   Constitutional:  Negative for activity change and appetite change.   HENT:  Negative for dental problem.    Eyes:  Negative for discharge.   Respiratory:  Negative for shortness of breath.    Cardiovascular:  Negative for chest pain and leg swelling.   Gastrointestinal:  Negative for abdominal distention and abdominal pain.   Endocrine: Negative for cold intolerance.   Genitourinary:  Negative for dysuria.   Musculoskeletal:  Negative for arthralgias.   Skin:  Negative for color change and pallor.   Neurological:  Negative for dizziness.   Psychiatric/Behavioral:  Negative for agitation.      Pertinent Medical History            Medical History Reviewed by provider this encounter:  Meds      ".  Past Medical History   Past Medical History:   Diagnosis Date    Hypertension      History reviewed. No pertinent surgical history.  History reviewed. No pertinent family history.   reports that he has never smoked. He has never used smokeless tobacco. He reports that he does not currently use alcohol. He reports that he does not currently use drugs.  Current Outpatient Medications   Medication Instructions    losartan (COZAAR) 100 mg, Daily    nebivolol (BYSTOLIC) 10 mg tablet 1 tablet, Daily    NIFEdipine (PROCARDIA XL) 60 mg, Oral, Daily   No Known Allergies   Current Outpatient Medications on File Prior to Visit   Medication Sig Dispense Refill    losartan (COZAAR) 100 MG tablet Take 100 mg by mouth daily      nebivolol (BYSTOLIC) 10 mg tablet Take 1 tablet by mouth in the morning      [DISCONTINUED] ALPRAZolam (XANAX) 1 mg tablet 1-2 hr before procedure (Patient not taking: Reported on 4/15/2025) 1 tablet 0    [DISCONTINUED] HYDROcodone-acetaminophen (NORCO) 5-325 mg per tablet 1-2 tab every 6 hr if needed for pain (Patient not taking: Reported on 4/15/2025) 10 tablet 0     No current facility-administered medications on file prior to visit.      Social History     Tobacco Use    Smoking status: Never    Smokeless tobacco: Never   Substance and Sexual Activity    Alcohol use: Not Currently    Drug use: Not Currently    Sexual activity: Not on file        Objective   /98 (BP Location: Left arm, Patient Position: Sitting, Cuff Size: Adult)   Pulse 57   Ht 5' 11\" (1.803 m)   Wt 91.2 kg (201 lb)   SpO2 99%   BMI 28.03 kg/m²      Physical Exam  General:  no acute distress at this time  Skin:  No acute rash  Eyes:  No scleral icterus and noninjected  ENT:  mucous membranes moist  Neck:  no carotid bruits  Chest:  Clear to auscultation percussion, good respiratory effort, no use of accessory respiratory muscles  CVS:  Regular rate and rhythm without rub   Abdomen:  soft and nontender   Extremities: no " significant lower extremity edema  Neuro:  No gross focality  Psych:  Alert , cooperative

## 2025-04-15 NOTE — ASSESSMENT & PLAN NOTE
Volume: Euvolemic  Blood pressure: Hypertensive 158/98  Recommend:  Low-sodium diet  Change losartan to 100 mg at night  Nebivolol 10 mg daily  Start nifedipine 60 mg in the morning  Will do VAS, renin aldosterone cortisol  24-hour BP monitor  Advised to maintain a good BP control to prevent progression of CKD         Orders:    VAS renal artery complete; Future    Aldosterone/Renin Ratio; Future    POCT urine dip    Cortisol Level, AM Specimen; Future    Metanephrine, Fractionated Plasma Free; Future    Urinalysis with microscopic; Future    Albumin / creatinine urine ratio; Future    NIFEdipine (PROCARDIA XL) 30 mg 24 hr tablet; Take 2 tablets (60 mg total) by mouth daily    Basic metabolic panel; Future    24 hour blood pressure monitoring; Future

## 2025-04-17 ENCOUNTER — TELEPHONE (OUTPATIENT)
Dept: NEPHROLOGY | Facility: CLINIC | Age: 40
End: 2025-04-17

## 2025-04-17 NOTE — TELEPHONE ENCOUNTER
Spoke to patient's wife- she is aware that we will call to schedule once the 24 BP monitor is working again.

## 2025-04-18 ENCOUNTER — OFFICE VISIT (OUTPATIENT)
Dept: LAB | Facility: CLINIC | Age: 40
End: 2025-04-18
Payer: COMMERCIAL

## 2025-04-18 DIAGNOSIS — I10 ESSENTIAL HYPERTENSION: ICD-10-CM

## 2025-04-18 LAB
ANION GAP SERPL CALCULATED.3IONS-SCNC: 10 MMOL/L (ref 4–13)
BACTERIA UR QL AUTO: NORMAL /HPF
BILIRUB UR QL STRIP: NEGATIVE
BUN SERPL-MCNC: 16 MG/DL (ref 5–25)
CALCIUM SERPL-MCNC: 9.2 MG/DL (ref 8.4–10.2)
CHLORIDE SERPL-SCNC: 106 MMOL/L (ref 96–108)
CLARITY UR: CLEAR
CO2 SERPL-SCNC: 27 MMOL/L (ref 21–32)
COLOR UR: COLORLESS
CORTIS AM PEAK SERPL-MCNC: 6.9 UG/DL (ref 6.7–22.6)
CREAT SERPL-MCNC: 1 MG/DL (ref 0.6–1.3)
CREAT UR-MCNC: 45.6 MG/DL
GFR SERPL CREATININE-BSD FRML MDRD: 94 ML/MIN/1.73SQ M
GLUCOSE P FAST SERPL-MCNC: 96 MG/DL (ref 65–99)
GLUCOSE UR STRIP-MCNC: NEGATIVE MG/DL
HGB UR QL STRIP.AUTO: NEGATIVE
KETONES UR STRIP-MCNC: NEGATIVE MG/DL
LEUKOCYTE ESTERASE UR QL STRIP: NEGATIVE
MICROALBUMIN UR-MCNC: 7 MG/L
MICROALBUMIN/CREAT 24H UR: 15 MG/G CREATININE (ref 0–30)
NITRITE UR QL STRIP: NEGATIVE
NON-SQ EPI CELLS URNS QL MICRO: NORMAL /HPF
PH UR STRIP.AUTO: 6.5 [PH]
POTASSIUM SERPL-SCNC: 3.9 MMOL/L (ref 3.5–5.3)
PROT UR STRIP-MCNC: NEGATIVE MG/DL
RBC #/AREA URNS AUTO: NORMAL /HPF
SODIUM SERPL-SCNC: 143 MMOL/L (ref 135–147)
SP GR UR STRIP.AUTO: 1.01 (ref 1–1.03)
UROBILINOGEN UR STRIP-ACNC: <2 MG/DL
WBC #/AREA URNS AUTO: NORMAL /HPF

## 2025-04-18 PROCEDURE — 83835 ASSAY OF METANEPHRINES: CPT

## 2025-04-18 PROCEDURE — 84244 ASSAY OF RENIN: CPT

## 2025-04-18 PROCEDURE — 36415 COLL VENOUS BLD VENIPUNCTURE: CPT

## 2025-04-18 PROCEDURE — 80048 BASIC METABOLIC PNL TOTAL CA: CPT

## 2025-04-18 PROCEDURE — 82043 UR ALBUMIN QUANTITATIVE: CPT

## 2025-04-18 PROCEDURE — 81001 URINALYSIS AUTO W/SCOPE: CPT

## 2025-04-18 PROCEDURE — 82088 ASSAY OF ALDOSTERONE: CPT

## 2025-04-18 PROCEDURE — 82533 TOTAL CORTISOL: CPT

## 2025-04-18 PROCEDURE — 82570 ASSAY OF URINE CREATININE: CPT

## 2025-04-20 ENCOUNTER — RESULTS FOLLOW-UP (OUTPATIENT)
Dept: NEPHROLOGY | Facility: CLINIC | Age: 40
End: 2025-04-20

## 2025-04-24 LAB
ALDOST SERPL-MCNC: 10.1 NG/DL (ref 0–30)
ALDOST/RENIN PLAS-RTO: 58.4 {RATIO} (ref 0–30)
RENIN PLAS-CCNC: 0.17 NG/ML/HR (ref 0.17–5.38)

## 2025-04-25 LAB
METANEPH FREE SERPL-MCNC: 34.2 PG/ML (ref 0–88)
NORMETANEPHRINE SERPL-MCNC: 49.6 PG/ML (ref 0–210.1)

## 2025-05-08 ENCOUNTER — HOSPITAL ENCOUNTER (OUTPATIENT)
Dept: NON INVASIVE DIAGNOSTICS | Facility: HOSPITAL | Age: 40
Discharge: HOME | End: 2025-05-08
Payer: COMMERCIAL

## 2025-05-08 DIAGNOSIS — I10 ESSENTIAL HYPERTENSION: ICD-10-CM

## 2025-05-08 PROCEDURE — 93975 VASCULAR STUDY: CPT | Performed by: SURGERY

## 2025-05-08 PROCEDURE — 93975 VASCULAR STUDY: CPT

## 2025-06-18 ENCOUNTER — TELEPHONE (OUTPATIENT)
Age: 40
End: 2025-06-18

## 2025-06-18 NOTE — TELEPHONE ENCOUNTER
Mary Kate from PCP office states patient was seen yesterday and PCP wanted to make provider aware patient is unable to get nebivolol. States insurance will not cover a third hypertensive agent.

## 2025-06-26 ENCOUNTER — HOSPITAL ENCOUNTER (OUTPATIENT)
Dept: CT IMAGING | Facility: HOSPITAL | Age: 40
End: 2025-06-26
Attending: FAMILY MEDICINE
Payer: COMMERCIAL

## 2025-06-26 DIAGNOSIS — R74.8 ABNORMAL LEVELS OF OTHER SERUM ENZYMES: ICD-10-CM

## 2025-06-26 PROCEDURE — 74170 CT ABD WO CNTRST FLWD CNTRST: CPT

## 2025-06-26 RX ADMIN — IOHEXOL 100 ML: 350 INJECTION, SOLUTION INTRAVENOUS at 08:21

## 2025-06-27 ENCOUNTER — TELEPHONE (OUTPATIENT)
Dept: NEPHROLOGY | Facility: CLINIC | Age: 40
End: 2025-06-27

## 2025-06-27 NOTE — TELEPHONE ENCOUNTER
Spoke with Javier regarding 24 blood pressure monitor.     Scheduled to get on 2 pm 7/7 and get off 7/8.

## 2025-07-07 ENCOUNTER — OFFICE VISIT (OUTPATIENT)
Dept: NEPHROLOGY | Facility: CLINIC | Age: 40
End: 2025-07-07

## 2025-07-07 VITALS — DIASTOLIC BLOOD PRESSURE: 97 MMHG | HEART RATE: 55 BPM | SYSTOLIC BLOOD PRESSURE: 151 MMHG | OXYGEN SATURATION: 98 %

## 2025-07-07 DIAGNOSIS — I10 WHITE COAT SYNDROME WITH DIAGNOSIS OF HYPERTENSION: Primary | ICD-10-CM

## 2025-07-07 PROCEDURE — PBNCHG PB NO CHARGE PLACEHOLDER

## 2025-07-07 NOTE — PATIENT INSTRUCTIONS
-Patient/ parent/ guardian briefed on responsibility form for safe keeping of ABMP machine and equipment  -Patient/ parent/ guardian singed responsibility form  -Patient/ parent/ guardian briefed on instructions for ABMP use and BP long use and documentation.  -Patient/ parent/ guardian verbally acknowledged understanding all instructions.

## 2025-07-08 ENCOUNTER — OFFICE VISIT (OUTPATIENT)
Dept: NEPHROLOGY | Facility: CLINIC | Age: 40
End: 2025-07-08
Payer: COMMERCIAL

## 2025-07-08 DIAGNOSIS — I10 WHITE COAT SYNDROME WITH DIAGNOSIS OF HYPERTENSION: Primary | ICD-10-CM

## 2025-07-08 PROCEDURE — 93784 AMBL BP MNTR W/SOFTWARE: CPT | Performed by: STUDENT IN AN ORGANIZED HEALTH CARE EDUCATION/TRAINING PROGRAM

## 2025-07-08 NOTE — PROGRESS NOTES
Fax 24 HR ABPM report to Centralized Faxing Team to sort into patient chart.    Wendy will give the report to Dr. Reyes  tomorrow at .  Dr. Reyes is aware.

## 2025-07-08 NOTE — PATIENT INSTRUCTIONS
-24 HR ABPM returned in working condition with all equipment  - Patient had no additional questions or concerns.

## 2025-07-24 ENCOUNTER — DOCUMENTATION (OUTPATIENT)
Dept: NEPHROLOGY | Facility: CLINIC | Age: 40
End: 2025-07-24

## 2025-07-24 NOTE — PROGRESS NOTES
I have had the pleasure of interpreting a 24 hour ambulatory blood pressure monitor report performed on Aryan Bingham from 7/7/2025 to 7/8/2025. There were 40 out of 48 successful readings obtained during that time     Of note, the patient had no obvious symptoms listed on his diary.    The results were as follows:    This was an optimal study due to adequate time of monitoring.    Aryan Bingham’s average blood pressure reading was 138/87 with a heart rate of 52. The range was a minimum of 119/69 mm Hg and a maximum of 166/114 mm Hg.    The Daytime average blood pressure reading was 142/91 mm Hg with a heart rate of 51, with 77.78% of the systolic readings greater than 135 mm Hg and 77.78% of the diastolic readings greater than 85 mm Hg. There was a significant systolic daytime blood pressure load and significant diastolic daytime blood pressure load. (>20% is significant)    The Nighttime average blood pressure reading was 133/79 with a heart rate of 54, with 92.31% of the systolic readings greater than 120 mm Hg and 84.62% of the diastolic readings greater than 70 mm Hg. There was significant systolic night time blood pressure load, significant diastolic night time blood pressure load. (>20% is significant)     The Mean Arterial Blood Pressure (MABP) nocturnal dipping was 9.91%.    Impression:    Aryan Bingham’s 24 hour ambulatory blood pressure monitor average reading was 139/87 mm Hg, with a daytime average blood pressure reading of 142/91 mm Hg and a night time average blood pressure reading of 133/79 mm Hg. The MABP nocturnal dipping was 9.91%    Whether your patient has hypertension requiring treatment or not should be individualized accordingly to the risk versus benefits of the treatment.  The definition of hypertension can be found from multiple sources, with the more recent ACC/AHA guidelines from 2017 providing an update on blood pressure thresholds from prior guidelines.        Failure of the blood  pressure to dip by at least 10% is called Non-dipping. Independent of hypertension, non-dipping is associated with end organ damage, and in some studies, progression of chronic kidney disease and cardiovascular events. Non-dippers should have an evaluation, in the appropriate setting, for underlying comorbidities (for example, sleep apnea and chronic kidney disease).    Recommendations:    For those patients meeting hypertension criteria, further workup for end organ damage and secondary causes should be considered, as appropriate, in the overall evaluation and treatment of the patient.    The decision to treat the patient with lifestyle modifications vs anti-hypertensive medications is based on the patient's ASCVD (Atherosclerotic Cardiovascular Disease) risk score, age and co-mordibities,  and thus must be individualized to the patient by the provider as per the AHA/ACC 2017 Guidelines.    Plan to start nifedipine 30mg and increase to 60 mg as needed         Joselyn Reyes Bahamonde, MD  Nephrology Attending